# Patient Record
Sex: FEMALE | Race: WHITE | NOT HISPANIC OR LATINO | URBAN - METROPOLITAN AREA
[De-identification: names, ages, dates, MRNs, and addresses within clinical notes are randomized per-mention and may not be internally consistent; named-entity substitution may affect disease eponyms.]

---

## 2024-02-01 ENCOUNTER — CLINICAL SUPPORT (OUTPATIENT)
Dept: EMERGENCY MEDICINE | Facility: HOSPITAL | Age: 54
End: 2024-02-01
Payer: MEDICARE

## 2024-02-01 ENCOUNTER — HOSPITAL ENCOUNTER (EMERGENCY)
Facility: HOSPITAL | Age: 54
Discharge: HOME | End: 2024-02-01
Payer: MEDICARE

## 2024-02-01 ENCOUNTER — APPOINTMENT (OUTPATIENT)
Dept: RADIOLOGY | Facility: HOSPITAL | Age: 54
End: 2024-02-01
Payer: MEDICARE

## 2024-02-01 VITALS
HEIGHT: 63 IN | SYSTOLIC BLOOD PRESSURE: 105 MMHG | TEMPERATURE: 98.4 F | DIASTOLIC BLOOD PRESSURE: 65 MMHG | BODY MASS INDEX: 43.94 KG/M2 | WEIGHT: 248 LBS | OXYGEN SATURATION: 96 % | HEART RATE: 105 BPM | RESPIRATION RATE: 20 BRPM

## 2024-02-01 DIAGNOSIS — N39.0 URINARY TRACT INFECTION IN FEMALE: Primary | ICD-10-CM

## 2024-02-01 DIAGNOSIS — K59.00 CONSTIPATION, UNSPECIFIED CONSTIPATION TYPE: ICD-10-CM

## 2024-02-01 PROBLEM — E66.01 OBESITY, CLASS III, BMI 40-49.9 (MORBID OBESITY) (MULTI): Status: ACTIVE | Noted: 2017-11-11

## 2024-02-01 PROBLEM — E11.42 TYPE 2 DIABETES MELLITUS WITH POLYNEUROPATHY (MULTI): Status: ACTIVE | Noted: 2024-02-01

## 2024-02-01 PROBLEM — J45.20 MILD INTERMITTENT ASTHMA WITHOUT COMPLICATION (HHS-HCC): Status: ACTIVE | Noted: 2022-11-04

## 2024-02-01 LAB
ALBUMIN SERPL BCP-MCNC: 4.6 G/DL (ref 3.4–5)
ALP SERPL-CCNC: 48 U/L (ref 33–110)
ALT SERPL W P-5'-P-CCNC: 34 U/L (ref 7–45)
ANION GAP SERPL CALC-SCNC: 13 MMOL/L (ref 10–20)
APPEARANCE UR: ABNORMAL
AST SERPL W P-5'-P-CCNC: 22 U/L (ref 9–39)
BACTERIA #/AREA URNS AUTO: ABNORMAL /HPF
BASOPHILS # BLD AUTO: 0.06 X10*3/UL (ref 0–0.1)
BASOPHILS NFR BLD AUTO: 0.6 %
BILIRUB SERPL-MCNC: 0.3 MG/DL (ref 0–1.2)
BILIRUB UR STRIP.AUTO-MCNC: NEGATIVE MG/DL
BUN SERPL-MCNC: 13 MG/DL (ref 6–23)
CALCIUM SERPL-MCNC: 9.5 MG/DL (ref 8.6–10.6)
CHLORIDE SERPL-SCNC: 95 MMOL/L (ref 98–107)
CO2 SERPL-SCNC: 28 MMOL/L (ref 21–32)
COLOR UR: ABNORMAL
CREAT SERPL-MCNC: 1.01 MG/DL (ref 0.5–1.05)
EGFRCR SERPLBLD CKD-EPI 2021: 67 ML/MIN/1.73M*2
EOSINOPHIL # BLD AUTO: 0.14 X10*3/UL (ref 0–0.7)
EOSINOPHIL NFR BLD AUTO: 1.3 %
ERYTHROCYTE [DISTWIDTH] IN BLOOD BY AUTOMATED COUNT: 13.5 % (ref 11.5–14.5)
GLUCOSE SERPL-MCNC: 163 MG/DL (ref 74–99)
GLUCOSE UR STRIP.AUTO-MCNC: NEGATIVE MG/DL
HCT VFR BLD AUTO: 36.4 % (ref 36–46)
HGB BLD-MCNC: 12.3 G/DL (ref 12–16)
HYALINE CASTS #/AREA URNS AUTO: ABNORMAL /LPF
IMM GRANULOCYTES # BLD AUTO: 0.02 X10*3/UL (ref 0–0.7)
IMM GRANULOCYTES NFR BLD AUTO: 0.2 % (ref 0–0.9)
KETONES UR STRIP.AUTO-MCNC: ABNORMAL MG/DL
LEUKOCYTE ESTERASE UR QL STRIP.AUTO: ABNORMAL
LIPASE SERPL-CCNC: 37 U/L (ref 9–82)
LYMPHOCYTES # BLD AUTO: 3.05 X10*3/UL (ref 1.2–4.8)
LYMPHOCYTES NFR BLD AUTO: 28.8 %
MCH RBC QN AUTO: 27.7 PG (ref 26–34)
MCHC RBC AUTO-ENTMCNC: 33.8 G/DL (ref 32–36)
MCV RBC AUTO: 82 FL (ref 80–100)
MONOCYTES # BLD AUTO: 0.53 X10*3/UL (ref 0.1–1)
MONOCYTES NFR BLD AUTO: 5 %
MUCOUS THREADS #/AREA URNS AUTO: ABNORMAL /LPF
NEUTROPHILS # BLD AUTO: 6.79 X10*3/UL (ref 1.2–7.7)
NEUTROPHILS NFR BLD AUTO: 64.1 %
NITRITE UR QL STRIP.AUTO: NEGATIVE
NRBC BLD-RTO: 0 /100 WBCS (ref 0–0)
PH UR STRIP.AUTO: 5 [PH]
PLATELET # BLD AUTO: 271 X10*3/UL (ref 150–450)
POTASSIUM SERPL-SCNC: 3.9 MMOL/L (ref 3.5–5.3)
PROT SERPL-MCNC: 7.3 G/DL (ref 6.4–8.2)
PROT UR STRIP.AUTO-MCNC: ABNORMAL MG/DL
RBC # BLD AUTO: 4.44 X10*6/UL (ref 4–5.2)
RBC # UR STRIP.AUTO: NEGATIVE /UL
RBC #/AREA URNS AUTO: ABNORMAL /HPF
SODIUM SERPL-SCNC: 132 MMOL/L (ref 136–145)
SP GR UR STRIP.AUTO: 1.03
SQUAMOUS #/AREA URNS AUTO: ABNORMAL /HPF
UROBILINOGEN UR STRIP.AUTO-MCNC: 2 MG/DL
WBC # BLD AUTO: 10.6 X10*3/UL (ref 4.4–11.3)
WBC #/AREA URNS AUTO: >50 /HPF

## 2024-02-01 PROCEDURE — 99285 EMERGENCY DEPT VISIT HI MDM: CPT

## 2024-02-01 PROCEDURE — 87086 URINE CULTURE/COLONY COUNT: CPT

## 2024-02-01 PROCEDURE — 74177 CT ABD & PELVIS W/CONTRAST: CPT

## 2024-02-01 PROCEDURE — 74177 CT ABD & PELVIS W/CONTRAST: CPT | Mod: FOREIGN READ | Performed by: RADIOLOGY

## 2024-02-01 PROCEDURE — 85025 COMPLETE CBC W/AUTO DIFF WBC: CPT

## 2024-02-01 PROCEDURE — 99284 EMERGENCY DEPT VISIT MOD MDM: CPT

## 2024-02-01 PROCEDURE — 83690 ASSAY OF LIPASE: CPT

## 2024-02-01 PROCEDURE — 93005 ELECTROCARDIOGRAM TRACING: CPT

## 2024-02-01 PROCEDURE — 36415 COLL VENOUS BLD VENIPUNCTURE: CPT

## 2024-02-01 PROCEDURE — 81001 URINALYSIS AUTO W/SCOPE: CPT

## 2024-02-01 PROCEDURE — 2550000001 HC RX 255 CONTRASTS

## 2024-02-01 PROCEDURE — 84075 ASSAY ALKALINE PHOSPHATASE: CPT

## 2024-02-01 PROCEDURE — 2500000004 HC RX 250 GENERAL PHARMACY W/ HCPCS (ALT 636 FOR OP/ED)

## 2024-02-01 RX ORDER — SULFAMETHOXAZOLE AND TRIMETHOPRIM 800; 160 MG/1; MG/1
1 TABLET ORAL 2 TIMES DAILY
Qty: 28 TABLET | Refills: 0 | Status: SHIPPED | OUTPATIENT
Start: 2024-02-01 | End: 2024-02-15

## 2024-02-01 RX ORDER — POLYETHYLENE GLYCOL 3350 17 G/17G
17 POWDER, FOR SOLUTION ORAL DAILY
Qty: 510 G | Refills: 0 | Status: SHIPPED | OUTPATIENT
Start: 2024-02-01 | End: 2024-03-02

## 2024-02-01 RX ORDER — DOCUSATE SODIUM 100 MG/1
100 CAPSULE, LIQUID FILLED ORAL EVERY 12 HOURS
Qty: 60 CAPSULE | Refills: 0 | Status: SHIPPED | OUTPATIENT
Start: 2024-02-01 | End: 2024-03-02

## 2024-02-01 RX ORDER — SULFAMETHOXAZOLE AND TRIMETHOPRIM 800; 160 MG/1; MG/1
1 TABLET ORAL ONCE
Status: COMPLETED | OUTPATIENT
Start: 2024-02-01 | End: 2024-02-01

## 2024-02-01 RX ADMIN — SULFAMETHOXAZOLE AND TRIMETHOPRIM 1 TABLET: 800; 160 TABLET ORAL at 17:35

## 2024-02-01 RX ADMIN — IOHEXOL 90 ML: 350 INJECTION, SOLUTION INTRAVENOUS at 17:33

## 2024-02-01 ASSESSMENT — COLUMBIA-SUICIDE SEVERITY RATING SCALE - C-SSRS
6. HAVE YOU EVER DONE ANYTHING, STARTED TO DO ANYTHING, OR PREPARED TO DO ANYTHING TO END YOUR LIFE?: NO
1. IN THE PAST MONTH, HAVE YOU WISHED YOU WERE DEAD OR WISHED YOU COULD GO TO SLEEP AND NOT WAKE UP?: NO
2. HAVE YOU ACTUALLY HAD ANY THOUGHTS OF KILLING YOURSELF?: NO

## 2024-02-01 NOTE — ED TRIAGE NOTES
Pt presents to the ED with LLQ pain that started earlier this week. Pt denies N/V/D. Pt states [facility] and told her to come her for a scan.

## 2024-02-01 NOTE — ED PROVIDER NOTES
"HPI   Chief Complaint   Patient presents with    Abdominal Pain       53-year-old female with past medical history of T2DM presenting today for abdominal pain.  Reports that she has had pain in her abdomen for approximately 5 days.  Notes it is in the left lower quadrant without any notable radiation.  Describes it as a sharp pain that is constant 8 out of 10.  Endorses nausea without emesis.  No fever or chills.  No diarrhea.  Last bowel movement was yesterday, though she reports that after that, she did not have a bowel movement for 5 days.  Unsure if she is constipated.  Went to see her PCP today and was told to come to the ER to get an abdominal scan after physician palpated her stomach and felt a \"mass\".                          [place] Coma Scale Score: 15                  Patient History   Past Medical History:   Diagnosis Date    Fracture of orbital floor, unspecified side, initial encounter for closed fracture ([facility]) 07/10/2017    Fracture of inferior orbital wall    Personal history of other diseases of the circulatory system     History of hypertension    Personal history of other endocrine, nutritional and metabolic disease     History of type 2 diabetes mellitus    Personal history of other endocrine, nutritional and metabolic disease     History of high cholesterol    Unspecified injury of left eye and orbit, initial encounter 07/10/2017    Ocular trauma of left eye    Unspecified pterygium of left eye 07/10/2017    Pterygium of left eye     Past Surgical History:   Procedure Laterality Date    KNEE SURGERY  06/29/2017    Knee Surgery     No family history on file.  Social History     Tobacco Use    Smoking status: Not on file    Smokeless tobacco: Not on file   Substance Use Topics    Alcohol use: Not on file    Drug use: Not on file       Physical Exam   ED Triage Vitals [02/01/24 1355]   Temperature Heart Rate Respirations BP   36.9 °C (98.4 °F) (!) 105 20 105/65      Pulse Ox Temp Source Heart " Rate Source Patient Position   96 % Temporal -- --      BP Location FiO2 (%)     -- --       Physical Exam  Vitals and nursing note reviewed.   Constitutional:       General: She is not in acute distress.     Appearance: Normal appearance. She is not ill-appearing.   HENT:      Head: Normocephalic and atraumatic.      Right Ear: External ear normal.      Left Ear: External ear normal.      Nose: Nose normal. No congestion or rhinorrhea.      Mouth/Throat:      Mouth: Mucous membranes are moist.      Pharynx: Oropharynx is clear. No oropharyngeal exudate or posterior oropharyngeal erythema.   Eyes:      Extraocular Movements: Extraocular movements intact.      Conjunctiva/sclera: Conjunctivae normal.      Pupils: Pupils are equal, round, and reactive to light.   Cardiovascular:      Rate and Rhythm: Normal rate and regular rhythm.      Heart sounds: Normal heart sounds.   Pulmonary:      Effort: No accessory muscle usage or respiratory distress.      Breath sounds: Normal breath sounds. No wheezing, rhonchi or rales.   Abdominal:      General: Abdomen is flat. Bowel sounds are normal. There is no distension.      Palpations: Abdomen is soft.      Tenderness: There is abdominal tenderness in the left lower quadrant. There is no right CVA tenderness or left CVA tenderness.   Musculoskeletal:         General: No swelling or deformity. Normal range of motion.      Cervical back: Normal range of motion and neck supple.      Right lower leg: No edema.      Left lower leg: No edema.   Skin:     General: Skin is warm and dry.      Capillary Refill: Capillary refill takes less than 2 seconds.   Neurological:      General: No focal deficit present.      Mental Status: She is alert and oriented to person, place, and time.      GCS: GCS eye subscore is 4. GCS verbal subscore is 5. GCS motor subscore is 6.      Cranial Nerves: Cranial nerves 2-12 are intact.      Sensory: No sensory deficit.      Motor: Motor function is intact.  No weakness.   Psychiatric:         Mood and Affect: Mood and affect normal.         Speech: Speech normal.         Behavior: Behavior normal. Behavior is cooperative.         ED Course & MDM   ED Course as of 02/01/24 1919   Thu Feb 01, 2024 1711 Leukocyte Esterase, Urine(!): LARGE (3+) [ML]   1711 WBC, Urine(!): >50 [ML]   1711 CBC and Auto Differential  No leukocytosis or acute anemia [ML]   1711 LIPASE: 37  wnl [ML]   1711 Comprehensive metabolic panel(!)  Mildly decreased sodium and chloride.  No other electrolyte abnormalities, CHRISTY or elevated LFTs [ML]   1919 CT abdomen pelvis w IV contrast  Constipation limits evaluation of the colonic lumen.  5.1 x 4.6 cm fat-containing umbilical hernia.  Large amount of food debris within the stomach.  No acute CT findings within abdomen or pelvis.   [ML]      ED Course User Index  [ML] Toña Ralph PA-C         Diagnoses as of 02/01/24 1919   Urinary tract infection in female   Constipation, unspecified constipation type       Medical Decision Making  53-year-old female presenting today for abdominal pain.  Mildly tachycardic at 105, though no signs of SIRS.  No fever.  She does have tenderness in her left lower quadrant on palpation, though I am palpating no obvious masses, though due to body habitus, it is difficulkt.  No guarding or distention.  No CVA tenderness.  Having no diarrhea to be concern for diverticulitis.  Had a bowel movement a yesterday, so I have low concern for SBO, but does report that prior to that BM, she did not have a BM for a week, so she believes that she is constipated.  No urinary symptoms to be concern for UTI or Parker, though we will proceed with urinalysis to rule this out in the setting of 50-year-old woman with abdominal pain and nausea.  No chest pain or shortness of breath.  No URI-like symptoms.  Will proceed with a CT abdomen considering the tenderness to her left lower quadrant abdominal pain.    CBC and CMP are unremarkable.   "Lipase within normal limits.  Her urinalysis did come back showing 50+ white blood cells with large amount of leukocyte Estrace.  In the setting of abdominal pain and nausea, will treat this as pyelonephritis giving her first dose of Bactrim here in the ER.    CT results: Constipation limits evaluation of the colonic lumen, though there is no obvious evidence of small bowel obstruction.  There is a fat-containing umbilical hernia that could be the \"mass that they fell\".  There are also large amounts of food debris within the stomach.  No acute CT findings within the abdomen or pelvis.    Likely, abdominal pain could be due to UTI and constipation.  Will send patient home on Bactrim.  Will also send her home with MiraLAX and stool softener.  She is instructed to follow-up with her PCP in the next few days for further evaluation.  I also gave her strict return precautions when to come back to the ER.        Procedure  Procedures     Toña Ralph PA-C  02/01/24 1918       Toña Ralph PA-C  02/01/24 1919    "

## 2024-02-02 LAB
ATRIAL RATE: 94 BPM
HOLD SPECIMEN: NORMAL
P AXIS: 34 DEGREES
P OFFSET: 186 MS
P ONSET: 140 MS
PR INTERVAL: 160 MS
Q ONSET: 220 MS
QRS COUNT: 16 BEATS
QRS DURATION: 84 MS
QT INTERVAL: 316 MS
QTC CALCULATION(BAZETT): 395 MS
QTC FREDERICIA: 367 MS
R AXIS: 47 DEGREES
T AXIS: 16 DEGREES
T OFFSET: 378 MS
VENTRICULAR RATE: 94 BPM

## 2024-02-02 NOTE — DISCHARGE INSTRUCTIONS
You do have a UTI which is likely causing the Abdominal pain.  You also are very constipated.  This could also cause the pain in your stomach.  No obvious masses, though you have a small umbilical hernia which should not be causing you pain.  Sending you home with antibiotics that you should take twice a day for your UTI until you finish the bottle.  Also sending you home with 2 medications to help move your bowels for a bowel movement.

## 2024-02-03 LAB — BACTERIA UR CULT: ABNORMAL
